# Patient Record
Sex: FEMALE | Race: WHITE | Employment: FULL TIME | URBAN - METROPOLITAN AREA
[De-identification: names, ages, dates, MRNs, and addresses within clinical notes are randomized per-mention and may not be internally consistent; named-entity substitution may affect disease eponyms.]

---

## 2017-02-10 ENCOUNTER — OFFICE VISIT (OUTPATIENT)
Dept: CARDIOLOGY CLINIC | Age: 52
End: 2017-02-10

## 2017-02-10 VITALS
HEIGHT: 64 IN | SYSTOLIC BLOOD PRESSURE: 100 MMHG | RESPIRATION RATE: 18 BRPM | OXYGEN SATURATION: 98 % | WEIGHT: 215.6 LBS | BODY MASS INDEX: 36.81 KG/M2 | DIASTOLIC BLOOD PRESSURE: 70 MMHG | HEART RATE: 87 BPM

## 2017-02-10 DIAGNOSIS — I50.22 CHRONIC SYSTOLIC CONGESTIVE HEART FAILURE (HCC): Primary | ICD-10-CM

## 2017-02-10 RX ORDER — LINACLOTIDE 290 UG/1
CAPSULE, GELATIN COATED ORAL AS NEEDED
COMMUNITY
Start: 2017-01-13

## 2017-02-10 RX ORDER — DICYCLOMINE HYDROCHLORIDE 20 MG/1
TABLET ORAL
Refills: 2 | COMMUNITY
Start: 2017-01-25

## 2017-02-10 RX ORDER — PANTOPRAZOLE SODIUM 40 MG/1
40 TABLET, DELAYED RELEASE ORAL DAILY
COMMUNITY

## 2017-02-10 RX ORDER — VALACYCLOVIR HYDROCHLORIDE 1 G/1
TABLET, FILM COATED ORAL AS NEEDED
COMMUNITY
Start: 2017-01-31

## 2017-02-10 RX ORDER — ALBUTEROL SULFATE 0.83 MG/ML
SOLUTION RESPIRATORY (INHALATION)
Refills: 0 | COMMUNITY
Start: 2016-11-10

## 2017-02-10 RX ORDER — CLOPIDOGREL BISULFATE 75 MG/1
TABLET ORAL
Refills: 4 | COMMUNITY
Start: 2017-01-09

## 2017-02-10 RX ORDER — EPINEPHRINE 0.3 MG/.3ML
INJECTION INTRAMUSCULAR
Refills: 2 | COMMUNITY
Start: 2017-01-25

## 2017-02-10 RX ORDER — PROGESTERONE, MICRONIZED 100 %
POWDER (GRAM) MISCELLANEOUS DAILY
COMMUNITY
Start: 2017-01-13

## 2017-02-10 RX ORDER — ESTRADIOL 0.03 MG/D
1 PATCH TRANSDERMAL EVERY OTHER DAY
COMMUNITY

## 2017-02-10 RX ORDER — MOMETASONE FUROATE AND FORMOTEROL FUMARATE DIHYDRATE 100; 5 UG/1; UG/1
AEROSOL RESPIRATORY (INHALATION) AS NEEDED
COMMUNITY
Start: 2017-01-13

## 2017-02-10 RX ORDER — CARVEDILOL 3.12 MG/1
TABLET ORAL
Refills: 2 | COMMUNITY
Start: 2017-01-27

## 2017-02-10 RX ORDER — METHOCARBAMOL 500 MG/1
TABLET, FILM COATED ORAL
Refills: 5 | COMMUNITY
Start: 2016-12-29

## 2017-02-10 RX ORDER — ACETAMINOPHEN AND CODEINE PHOSPHATE 300; 30 MG/1; MG/1
TABLET ORAL
Refills: 5 | COMMUNITY
Start: 2016-12-12

## 2017-02-10 RX ORDER — LEVOTHYROXINE SODIUM 175 UG/1
TABLET ORAL
Refills: 5 | COMMUNITY
Start: 2016-12-30

## 2017-02-10 NOTE — PROGRESS NOTES
Visit Vitals    /70 (BP 1 Location: Right arm, BP Patient Position: Sitting)    Pulse 87    Resp 18    Ht 5' 4\" (1.626 m)    Wt 215 lb 9.6 oz (97.8 kg)    SpO2 98%    BMI 37.01 kg/m2

## 2017-02-14 ENCOUNTER — HOSPITAL ENCOUNTER (OUTPATIENT)
Dept: MRI IMAGING | Age: 52
Discharge: HOME | End: 2017-02-14
Attending: INTERNAL MEDICINE
Payer: COMMERCIAL

## 2017-02-14 DIAGNOSIS — I42.9 CARDIOMYOPATHY (HCC): ICD-10-CM

## 2017-02-14 PROCEDURE — 74011636320 HC RX REV CODE- 636/320: Performed by: INTERNAL MEDICINE

## 2017-02-14 PROCEDURE — A9579 GAD-BASE MR CONTRAST NOS,1ML: HCPCS | Performed by: INTERNAL MEDICINE

## 2017-02-14 PROCEDURE — 75561 CARDIAC MRI FOR MORPH W/DYE: CPT

## 2017-02-14 RX ADMIN — GADOPENTETATE DIMEGLUMINE 20 ML: 469.01 INJECTION INTRAVENOUS at 14:35

## 2017-02-14 RX ADMIN — GADOPENTETATE DIMEGLUMINE 20 ML: 469.01 INJECTION INTRAVENOUS at 14:37
